# Patient Record
Sex: FEMALE | NOT HISPANIC OR LATINO | Employment: FULL TIME | URBAN - NONMETROPOLITAN AREA
[De-identification: names, ages, dates, MRNs, and addresses within clinical notes are randomized per-mention and may not be internally consistent; named-entity substitution may affect disease eponyms.]

---

## 2023-04-21 ENCOUNTER — OFFICE VISIT (OUTPATIENT)
Dept: PRIMARY CARE | Facility: CLINIC | Age: 23
End: 2023-04-21
Payer: MEDICAID

## 2023-04-21 VITALS
DIASTOLIC BLOOD PRESSURE: 81 MMHG | SYSTOLIC BLOOD PRESSURE: 127 MMHG | HEIGHT: 63 IN | HEART RATE: 91 BPM | BODY MASS INDEX: 22.68 KG/M2 | WEIGHT: 128 LBS

## 2023-04-21 DIAGNOSIS — M65.4 DE QUERVAIN'S DISEASE (TENOSYNOVITIS): ICD-10-CM

## 2023-04-21 DIAGNOSIS — L85.3 DRY SKIN DERMATITIS: ICD-10-CM

## 2023-04-21 DIAGNOSIS — Z00.00 ANNUAL PHYSICAL EXAM: Primary | ICD-10-CM

## 2023-04-21 PROCEDURE — 99214 OFFICE O/P EST MOD 30 MIN: CPT | Performed by: REGISTERED NURSE

## 2023-04-21 PROCEDURE — 1036F TOBACCO NON-USER: CPT | Performed by: REGISTERED NURSE

## 2023-04-21 RX ORDER — PREDNISONE 20 MG/1
40 TABLET ORAL DAILY
Qty: 10 TABLET | Refills: 0 | Status: SHIPPED | OUTPATIENT
Start: 2023-04-21 | End: 2023-04-26

## 2023-04-21 RX ORDER — IBUPROFEN 600 MG/1
TABLET ORAL
COMMUNITY
Start: 2022-12-29 | End: 2023-04-21 | Stop reason: WASHOUT

## 2023-04-21 RX ORDER — NORETHINDRONE ACETATE/ETHINYL ESTRADIOL AND FERROUS FUMARATE 1MG-20(21)
1 KIT ORAL DAILY
COMMUNITY
Start: 2023-02-01 | End: 2023-04-21 | Stop reason: WASHOUT

## 2023-04-21 RX ORDER — ONDANSETRON 4 MG/1
TABLET, FILM COATED ORAL
COMMUNITY
Start: 2022-06-12 | End: 2023-04-21 | Stop reason: WASHOUT

## 2023-04-21 RX ORDER — TRIAMCINOLONE ACETONIDE 1 MG/G
OINTMENT TOPICAL 2 TIMES DAILY PRN
Qty: 60 G | Refills: 0 | Status: SHIPPED | OUTPATIENT
Start: 2023-04-21 | End: 2023-08-19

## 2023-04-21 RX ORDER — ETODOLAC 300 MG/1
1 CAPSULE ORAL
COMMUNITY
Start: 2023-03-06 | End: 2023-04-21 | Stop reason: WASHOUT

## 2023-04-21 ASSESSMENT — ENCOUNTER SYMPTOMS
DIZZINESS: 0
WOUND: 0
RHINORRHEA: 0
SHORTNESS OF BREATH: 0
FEVER: 0
FREQUENCY: 0
HEADACHES: 0
CHILLS: 0
DIARRHEA: 0
WEAKNESS: 0
NAUSEA: 0
WHEEZING: 0
VOMITING: 0
DIFFICULTY URINATING: 0
NERVOUS/ANXIOUS: 0
ABDOMINAL PAIN: 0
COUGH: 0
EYE REDNESS: 0
CONSTIPATION: 0
EYE DISCHARGE: 0
CONFUSION: 0

## 2023-04-21 NOTE — PROGRESS NOTES
"Subjective   Patient ID: Jessica Pineda is a 23 y.o. female who presents for Establish Care.    HPI     Wrist pain: She was given an anti inflammatory and told to rest. DeQuervain Tenosynovitis. Denies any other injuries.   She had a daughter in December, she had an IV there. She was sore for a little bit and then it got worse. Denies numbness or tingling.    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    PMH: none   Surgery: ACL repaired, basketball.   Family: Mom Endometrial cancer 2 years ago   smoker: none   Etoh: sometimes   Drug use: none   PAP:  Following with  [facility]     Review of Systems   Constitutional:  Negative for chills and fever.   HENT:  Negative for congestion, ear pain and rhinorrhea.    Eyes:  Negative for discharge and redness.   Respiratory:  Negative for cough, shortness of breath and wheezing.    Cardiovascular:  Negative for chest pain and leg swelling.   Gastrointestinal:  Negative for abdominal pain, constipation, diarrhea, nausea and vomiting.   Genitourinary:  Negative for difficulty urinating, frequency and urgency.   Musculoskeletal:  Negative for gait problem.   Skin:  Negative for rash and wound.   Neurological:  Negative for dizziness, weakness and headaches.   Psychiatric/Behavioral:  Negative for confusion. The patient is not nervous/anxious.        Objective   /81 (BP Location: Left arm, Patient Position: Sitting, BP Cuff Size: Adult)   Pulse 91   Ht 1.6 m (5' 3\")   Wt 58.1 kg (128 lb)   BMI 22.67 kg/m²     Physical Exam  Vitals reviewed.   Constitutional:       Appearance: Normal appearance.   HENT:      Head: Normocephalic.      Right Ear: Tympanic membrane, ear canal and external ear normal.      Left Ear: Tympanic membrane, ear canal and external ear normal.      Nose: No rhinorrhea.      Mouth/Throat:      Mouth: Mucous membranes are moist.      Pharynx: Oropharynx is clear. "   Eyes:      Pupils: Pupils are equal, round, and reactive to light.   Cardiovascular:      Rate and Rhythm: Normal rate and regular rhythm.      Pulses: Normal pulses.   Pulmonary:      Effort: Pulmonary effort is normal.      Breath sounds: Normal breath sounds.   Abdominal:      General: Abdomen is flat. Bowel sounds are normal.      Palpations: Abdomen is soft.   Musculoskeletal:         General: No tenderness. Normal range of motion.      Right lower leg: No edema.      Left lower leg: No edema.      Comments: + Finkelstein test    Lymphadenopathy:      Cervical: No cervical adenopathy.   Skin:     General: Skin is warm and dry.      Findings: No rash.   Neurological:      Mental Status: She is alert and oriented to person, place, and time.   Psychiatric:         Mood and Affect: Mood normal.         Behavior: Behavior normal.         Assessment/Plan        #DeQuervain's   Continue wrist splint   Continue antiinflammatory  Trial Prednisone   If it is not improved in 3 more weeks   May need to see ortho for possible injections     #HCM  Pap utd with gyn   Blood work reviewed from last year no acute concerns

## 2023-12-07 PROBLEM — S83.249A ACUTE MEDIAL MENISCAL TEAR: Status: ACTIVE | Noted: 2023-12-07

## 2023-12-07 PROBLEM — M25.569 JOINT PAIN, KNEE: Status: ACTIVE | Noted: 2023-12-07

## 2023-12-07 PROBLEM — S83.519A ANTERIOR CRUCIATE LIGAMENT COMPLETE TEAR: Status: ACTIVE | Noted: 2023-12-07

## 2023-12-07 PROBLEM — F41.9 ANXIETY: Status: ACTIVE | Noted: 2023-12-07

## 2023-12-07 NOTE — PROGRESS NOTES
Subjective   Patient ID: Jessica Pineda is a 23 y.o. female who presents for Joint Pain (Recently started seeing chiropractor so issues has been resolved; would like to discuss anxiety today; rash on chest, itchy; ).    HPI   Rash on chest: Has been there for a couple weeks. No changes to anything topical. She did not try anything on it.     Anxiety: Week of thanksgiving, she was having heart fluttering. Happen for a couple days and having some Sob. She went to [facility], she had EKG and blood work and chest xray and ct and everything was normal. Was told it could be stress or anxiety. There is a lot of things going on. She is a new mom. Her fiance is getting deployed in 6 months. Everyday life.     Scheduled appointment for joint pains and tingling and has been seeing a chiropractor so these issues have resolved.     Wrist pain: She was given an anti inflammatory and told to rest. DeQuervain Tenosynovitis. Denies any other injuries.   She had a daughter in December, she had an IV there. She was sore for a little bit and then it got worse. Denies numbness or tingling.     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     PMH: none   Surgery: ACL repaired, basketball.   Family: Mom Endometrial cancer 2 years ago   smoker: none   Etoh: sometimes   Drug use: none   PAP:  Following with Dr. [facility]       Review of Systems   Constitutional:  Negative for chills and fever.   HENT:  Negative for congestion, ear pain and rhinorrhea.    Eyes:  Negative for discharge and redness.   Respiratory:  Negative for cough, shortness of breath and wheezing.    Cardiovascular:  Negative for chest pain and leg swelling.   Gastrointestinal:  Negative for abdominal pain, constipation, diarrhea, nausea and vomiting.   Genitourinary:  Negative for difficulty urinating, frequency and urgency.   Musculoskeletal:  Negative for gait problem.   Skin:  Negative for  "rash and wound.   Neurological:  Negative for dizziness, weakness and headaches.   Psychiatric/Behavioral:  Negative for confusion. The patient is not nervous/anxious.        Objective   /78 (BP Location: Right arm, Patient Position: Sitting, BP Cuff Size: Adult)   Pulse 95   Ht 1.6 m (5' 3\")   Wt 76.2 kg (168 lb)   BMI 29.76 kg/m²     Physical Exam  Chest:      Comments: Red rash down midline between breast        Assessment/Plan   Diagnoses and all orders for this visit:  Anxiety  -     hydrOXYzine HCL (Atarax) 25 mg tablet; Take 1 tablet (25 mg) by mouth 3 times a day.  - open to trailing daily medication if something as needed is not working well for her.   - Would try lexapro first   Rash and nonspecific skin eruption  Yeast infection  -     nystatin (Mycostatin) cream; Apply topically 2 times a day for 7 days. apply to affected area       #DeQuervain's   Continue wrist splint   Continue antiinflammatory     #HCM  Pap utd with gyn   Blood work reviewed from last year no acute concerns    "

## 2023-12-13 ENCOUNTER — OFFICE VISIT (OUTPATIENT)
Dept: PRIMARY CARE | Facility: CLINIC | Age: 23
End: 2023-12-13
Payer: MEDICAID

## 2023-12-13 VITALS
WEIGHT: 168 LBS | HEART RATE: 95 BPM | DIASTOLIC BLOOD PRESSURE: 78 MMHG | SYSTOLIC BLOOD PRESSURE: 125 MMHG | BODY MASS INDEX: 29.77 KG/M2 | HEIGHT: 63 IN

## 2023-12-13 DIAGNOSIS — B37.9 YEAST INFECTION: ICD-10-CM

## 2023-12-13 DIAGNOSIS — R21 RASH AND NONSPECIFIC SKIN ERUPTION: ICD-10-CM

## 2023-12-13 DIAGNOSIS — F41.9 ANXIETY: Primary | ICD-10-CM

## 2023-12-13 PROCEDURE — 99213 OFFICE O/P EST LOW 20 MIN: CPT | Performed by: REGISTERED NURSE

## 2023-12-13 RX ORDER — HYDROXYZINE HYDROCHLORIDE 25 MG/1
25 TABLET, FILM COATED ORAL 3 TIMES DAILY
Qty: 90 TABLET | Refills: 0 | Status: SHIPPED | OUTPATIENT
Start: 2023-12-13 | End: 2024-02-21 | Stop reason: ALTCHOICE

## 2023-12-13 RX ORDER — NYSTATIN 100000 U/G
CREAM TOPICAL 2 TIMES DAILY
Qty: 30 G | Refills: 0 | Status: SHIPPED | OUTPATIENT
Start: 2023-12-13 | End: 2023-12-20

## 2023-12-13 ASSESSMENT — ENCOUNTER SYMPTOMS
DIFFICULTY URINATING: 0
WEAKNESS: 0
HEADACHES: 0
ABDOMINAL PAIN: 0
CONSTIPATION: 0
FEVER: 0
WHEEZING: 0
EYE DISCHARGE: 0
VOMITING: 0
CONFUSION: 0
NERVOUS/ANXIOUS: 0
RHINORRHEA: 0
SHORTNESS OF BREATH: 0
DIARRHEA: 0
DIZZINESS: 0
EYE REDNESS: 0
CHILLS: 0
WOUND: 0
COUGH: 0
NAUSEA: 0
FREQUENCY: 0

## 2024-01-26 ENCOUNTER — OFFICE VISIT (OUTPATIENT)
Dept: PRIMARY CARE | Facility: CLINIC | Age: 24
End: 2024-01-26
Payer: MEDICAID

## 2024-01-26 VITALS
BODY MASS INDEX: 29.59 KG/M2 | HEART RATE: 93 BPM | DIASTOLIC BLOOD PRESSURE: 77 MMHG | WEIGHT: 167 LBS | SYSTOLIC BLOOD PRESSURE: 123 MMHG | HEIGHT: 63 IN

## 2024-01-26 DIAGNOSIS — J01.90 ACUTE SINUSITIS, RECURRENCE NOT SPECIFIED, UNSPECIFIED LOCATION: Primary | ICD-10-CM

## 2024-01-26 PROCEDURE — 99214 OFFICE O/P EST MOD 30 MIN: CPT | Performed by: FAMILY MEDICINE

## 2024-01-26 RX ORDER — CEPHALEXIN 500 MG/1
500 CAPSULE ORAL 2 TIMES DAILY
Qty: 20 CAPSULE | Refills: 0 | Status: SHIPPED | OUTPATIENT
Start: 2024-01-26 | End: 2024-02-06 | Stop reason: SDUPTHER

## 2024-01-26 RX ORDER — MECLIZINE HCL 12.5 MG 12.5 MG/1
TABLET ORAL
COMMUNITY
Start: 2024-01-25 | End: 2024-04-15 | Stop reason: WASHOUT

## 2024-01-26 NOTE — PROGRESS NOTES
"Jessica Pineda is a 23 y.o. female who presents for Sick Visit (Dizziness for the last week, did have a headache but that's gotten better, slight ear pain; fast movement and standing up makes dizziness worse)    Last Friday had a migraine and dizziness that lasted 4 days. HA resolved, still dizzy. Went to urgent care and was told it was benign vertigo and given rx for meclizine. She was given a shot of benadryl and that has helped a lot. She is having pressure in both ears. No ST, +PND, +Frontal pressure, +Rhinorrhea. No cough. No vomiting or diarrhea but she does feel nausea from the dizziness.     Objective   /77 (BP Location: Right arm, Patient Position: Sitting, BP Cuff Size: Adult)   Pulse 93   Ht 1.6 m (5' 3\")   Wt 75.8 kg (167 lb)   BMI 29.58 kg/m²     Gen: No acute distress, alert and oriented x3, pleasant   HEENT: moist mucous membranes, b/l external auditory canals are clear of debris, TMs within normal limits, no oropharyngeal lesions, eomi, perrla, B/L effusion noted  Neck: thyroid within normal limits, no lymphadenopathy   CV: RRR, normal S1/S2, no murmur   Resp: Clear to auscultation bilaterally, no wheezes or rhonchi appreciated  Abd: soft, nontender, non-distended, no guarding/rigidity, bowel sounds present  Extr: no edema, no calf tenderness  Derm: Skin is warm and dry, no rashes appreciated  Psych: mood is good, affect is congruent, good hygiene, normal speech and eye contact  Neuro: cranial nerves grossly intact, normal gait    Assessment/Plan     #Vertigo  Epley handout given  She has meclizine prn    #Acute sinusitis  Rx sent keflex  Recommend flonase as well  "

## 2024-02-06 DIAGNOSIS — J01.90 ACUTE SINUSITIS, RECURRENCE NOT SPECIFIED, UNSPECIFIED LOCATION: ICD-10-CM

## 2024-02-06 DIAGNOSIS — B37.9 YEAST INFECTION: Primary | ICD-10-CM

## 2024-02-06 RX ORDER — FLUCONAZOLE 150 MG/1
150 TABLET ORAL ONCE
Qty: 1 TABLET | Refills: 0 | Status: SHIPPED | OUTPATIENT
Start: 2024-02-06 | End: 2024-02-12 | Stop reason: SDUPTHER

## 2024-02-06 RX ORDER — CEPHALEXIN 500 MG/1
500 CAPSULE ORAL 2 TIMES DAILY
Qty: 14 CAPSULE | Refills: 0 | Status: SHIPPED | OUTPATIENT
Start: 2024-02-06 | End: 2024-02-12 | Stop reason: ALTCHOICE

## 2024-02-12 DIAGNOSIS — J01.90 ACUTE SINUSITIS, RECURRENCE NOT SPECIFIED, UNSPECIFIED LOCATION: Primary | ICD-10-CM

## 2024-02-12 DIAGNOSIS — B37.9 YEAST INFECTION: ICD-10-CM

## 2024-02-12 RX ORDER — PREDNISONE 20 MG/1
40 TABLET ORAL DAILY
Qty: 10 TABLET | Refills: 0 | Status: SHIPPED | OUTPATIENT
Start: 2024-02-12 | End: 2024-02-21 | Stop reason: ALTCHOICE

## 2024-02-12 RX ORDER — FLUCONAZOLE 150 MG/1
150 TABLET ORAL ONCE
Qty: 1 TABLET | Refills: 0 | Status: SHIPPED | OUTPATIENT
Start: 2024-02-12 | End: 2024-02-12

## 2024-02-12 RX ORDER — DOXYCYCLINE 100 MG/1
100 CAPSULE ORAL 2 TIMES DAILY
Qty: 20 CAPSULE | Refills: 0 | Status: SHIPPED | OUTPATIENT
Start: 2024-02-12 | End: 2024-02-21 | Stop reason: ALTCHOICE

## 2024-02-21 DIAGNOSIS — J01.90 ACUTE SINUSITIS, RECURRENCE NOT SPECIFIED, UNSPECIFIED LOCATION: Primary | ICD-10-CM

## 2024-02-21 RX ORDER — FLUTICASONE FUROATE 27.5 UG/1
1 SPRAY, METERED NASAL
Qty: 10 G | Refills: 5 | Status: SHIPPED | OUTPATIENT
Start: 2024-02-21 | End: 2025-02-20

## 2024-02-21 RX ORDER — CETIRIZINE HYDROCHLORIDE 10 MG/1
10 TABLET ORAL DAILY
Qty: 30 TABLET | Refills: 5 | Status: SHIPPED | OUTPATIENT
Start: 2024-02-21 | End: 2024-08-19

## 2024-02-21 RX ORDER — AMOXICILLIN AND CLAVULANATE POTASSIUM 400; 57 MG/5ML; MG/5ML
875 POWDER, FOR SUSPENSION ORAL EVERY 12 HOURS SCHEDULED
Qty: 218 ML | Refills: 0 | Status: SHIPPED | OUTPATIENT
Start: 2024-02-21 | End: 2024-03-02

## 2024-03-01 ENCOUNTER — HOSPITAL ENCOUNTER (EMERGENCY)
Facility: HOSPITAL | Age: 24
Discharge: HOME | End: 2024-03-01
Payer: MEDICAID

## 2024-03-01 ENCOUNTER — APPOINTMENT (OUTPATIENT)
Dept: RADIOLOGY | Facility: HOSPITAL | Age: 24
End: 2024-03-01
Payer: MEDICAID

## 2024-03-01 VITALS
DIASTOLIC BLOOD PRESSURE: 74 MMHG | WEIGHT: 170 LBS | OXYGEN SATURATION: 98 % | HEIGHT: 63 IN | BODY MASS INDEX: 30.12 KG/M2 | HEART RATE: 74 BPM | SYSTOLIC BLOOD PRESSURE: 116 MMHG | TEMPERATURE: 97.6 F | RESPIRATION RATE: 16 BRPM

## 2024-03-01 DIAGNOSIS — R51.9 ACUTE INTRACTABLE HEADACHE, UNSPECIFIED HEADACHE TYPE: Primary | ICD-10-CM

## 2024-03-01 LAB
FLUAV RNA RESP QL NAA+PROBE: NOT DETECTED
FLUBV RNA RESP QL NAA+PROBE: NOT DETECTED
RSV RNA RESP QL NAA+PROBE: NOT DETECTED
SARS-COV-2 RNA RESP QL NAA+PROBE: NOT DETECTED

## 2024-03-01 PROCEDURE — 70450 CT HEAD/BRAIN W/O DYE: CPT | Performed by: STUDENT IN AN ORGANIZED HEALTH CARE EDUCATION/TRAINING PROGRAM

## 2024-03-01 PROCEDURE — 70486 CT MAXILLOFACIAL W/O DYE: CPT

## 2024-03-01 PROCEDURE — 99285 EMERGENCY DEPT VISIT HI MDM: CPT | Mod: 25

## 2024-03-01 PROCEDURE — 96375 TX/PRO/DX INJ NEW DRUG ADDON: CPT

## 2024-03-01 PROCEDURE — 87637 SARSCOV2&INF A&B&RSV AMP PRB: CPT

## 2024-03-01 PROCEDURE — 96361 HYDRATE IV INFUSION ADD-ON: CPT

## 2024-03-01 PROCEDURE — 2500000004 HC RX 250 GENERAL PHARMACY W/ HCPCS (ALT 636 FOR OP/ED): Mod: SE

## 2024-03-01 PROCEDURE — 70450 CT HEAD/BRAIN W/O DYE: CPT

## 2024-03-01 PROCEDURE — 70486 CT MAXILLOFACIAL W/O DYE: CPT | Performed by: STUDENT IN AN ORGANIZED HEALTH CARE EDUCATION/TRAINING PROGRAM

## 2024-03-01 PROCEDURE — 96374 THER/PROPH/DIAG INJ IV PUSH: CPT

## 2024-03-01 RX ORDER — METOCLOPRAMIDE HYDROCHLORIDE 5 MG/ML
10 INJECTION INTRAMUSCULAR; INTRAVENOUS ONCE
Status: COMPLETED | OUTPATIENT
Start: 2024-03-01 | End: 2024-03-01

## 2024-03-01 RX ORDER — ACETAMINOPHEN 325 MG/1
TABLET ORAL
Status: DISCONTINUED
Start: 2024-03-01 | End: 2024-03-01 | Stop reason: HOSPADM

## 2024-03-01 RX ORDER — ACETAMINOPHEN 325 MG/1
650 TABLET ORAL ONCE
Status: COMPLETED | OUTPATIENT
Start: 2024-03-01 | End: 2024-03-01

## 2024-03-01 RX ORDER — DIPHENHYDRAMINE HYDROCHLORIDE 50 MG/ML
25 INJECTION INTRAMUSCULAR; INTRAVENOUS ONCE
Status: COMPLETED | OUTPATIENT
Start: 2024-03-01 | End: 2024-03-01

## 2024-03-01 RX ORDER — KETOROLAC TROMETHAMINE 30 MG/ML
30 INJECTION, SOLUTION INTRAMUSCULAR; INTRAVENOUS ONCE
Status: COMPLETED | OUTPATIENT
Start: 2024-03-01 | End: 2024-03-01

## 2024-03-01 RX ADMIN — METOCLOPRAMIDE HYDROCHLORIDE 10 MG: 5 INJECTION INTRAMUSCULAR; INTRAVENOUS at 18:29

## 2024-03-01 RX ADMIN — ACETAMINOPHEN 650 MG: 325 TABLET ORAL at 19:30

## 2024-03-01 RX ADMIN — KETOROLAC TROMETHAMINE 30 MG: 30 INJECTION, SOLUTION INTRAMUSCULAR; INTRAVENOUS at 18:27

## 2024-03-01 RX ADMIN — DIPHENHYDRAMINE HYDROCHLORIDE 25 MG: 50 INJECTION INTRAMUSCULAR; INTRAVENOUS at 18:28

## 2024-03-01 RX ADMIN — SODIUM CHLORIDE, POTASSIUM CHLORIDE, SODIUM LACTATE AND CALCIUM CHLORIDE 1000 ML: 600; 310; 30; 20 INJECTION, SOLUTION INTRAVENOUS at 18:26

## 2024-03-01 ASSESSMENT — PAIN DESCRIPTION - DESCRIPTORS: DESCRIPTORS: ACHING

## 2024-03-01 ASSESSMENT — COLUMBIA-SUICIDE SEVERITY RATING SCALE - C-SSRS
1. IN THE PAST MONTH, HAVE YOU WISHED YOU WERE DEAD OR WISHED YOU COULD GO TO SLEEP AND NOT WAKE UP?: NO
2. HAVE YOU ACTUALLY HAD ANY THOUGHTS OF KILLING YOURSELF?: NO
6. HAVE YOU EVER DONE ANYTHING, STARTED TO DO ANYTHING, OR PREPARED TO DO ANYTHING TO END YOUR LIFE?: NO

## 2024-03-01 ASSESSMENT — PAIN SCALES - GENERAL: PAINLEVEL_OUTOF10: 4

## 2024-03-01 ASSESSMENT — PAIN DESCRIPTION - PAIN TYPE: TYPE: ACUTE PAIN;CHRONIC PAIN

## 2024-03-01 ASSESSMENT — PAIN - FUNCTIONAL ASSESSMENT: PAIN_FUNCTIONAL_ASSESSMENT: 0-10

## 2024-03-01 ASSESSMENT — PAIN DESCRIPTION - LOCATION: LOCATION: HEAD

## 2024-03-01 ASSESSMENT — PAIN DESCRIPTION - FREQUENCY: FREQUENCY: CONSTANT/CONTINUOUS

## 2024-03-01 NOTE — ED PROVIDER NOTES
HPI   Chief Complaint   Patient presents with    Headache     Patient states head pressure for one month, she has been diagnosed with a sinus infection last month but her symptoms are still present        Patient is a 23-year-old female presenting to the ED with cc of head pressure x 1 month.  Patient states she has been diagnosed with bilateral ear infection, sinusitis and has had 3 rounds of antibiotics with no relief in symptoms.  Patient states she was in the ER the 24th for similar symptoms and again diagnosed with an ear infection and discharged home with antibiotics.  Patient denies any head injury.  Patient denies any history of migraines.  Patient states it is a bandlike pressure around the head and throbbing.  Patient states it is constant.  Patient has not had any pain medication today.  Patient states she did take an antihistamine with no relief in symptoms.  Patient states that the soonest she can go to an ENT is the 12th.  Patient denies any vision changes, fever chills, congestion rhinorrhea cough chest pain shortness of breath nausea vomiting abdominal pain diarrhea constipation.  Patient's last normal menstrual cycle was the beginning of this month.  Patient denies any alcohol or street drug abuse.  Patient states she does vape.                          [place] Coma Scale Score: 15                     Patient History   Past Medical History:   Diagnosis Date    Pilonidal cyst with abscess 09/25/2014    Pilonidal cyst with abscess     Past Surgical History:   Procedure Laterality Date    ANTERIOR CRUCIATE LIGAMENT REPAIR  08/26/2014    Primary Repair Of Knee Ligament Cruciate Anterior     No family history on file.  Social History     Tobacco Use    Smoking status: Former     Types: Cigarettes     Quit date: 2021     Years since quitting: 3.1    Smokeless tobacco: Current     Types: Snuff   Substance Use Topics    Alcohol use: Not Currently    Drug use: Never       Physical Exam   ED Triage Vitals  [03/01/24 1543]   Temperature Heart Rate Respirations BP   36.4 °C (97.6 °F) 100 19 138/84      SpO2 Temp src Heart Rate Source Patient Position   -- -- -- --      BP Location FiO2 (%)     -- --       Physical Exam  Constitutional:       Appearance: She is well-developed.   HENT:      Head: Normocephalic.   Eyes:      Extraocular Movements: Extraocular movements intact.      Pupils: Pupils are equal, round, and reactive to light.   Cardiovascular:      Rate and Rhythm: Normal rate and regular rhythm.      Heart sounds: Normal heart sounds.   Pulmonary:      Effort: Pulmonary effort is normal.      Breath sounds: Normal breath sounds. No wheezing, rhonchi or rales.   Abdominal:      Palpations: Abdomen is soft.      Tenderness: There is no abdominal tenderness. There is no guarding.   Musculoskeletal:         General: No tenderness. Normal range of motion.      Cervical back: Normal range of motion and neck supple.   Skin:     General: Skin is warm.      Capillary Refill: Capillary refill takes less than 2 seconds.   Neurological:      Mental Status: She is alert and oriented to person, place, and time. Mental status is at baseline.      GCS: GCS eye subscore is 4. GCS verbal subscore is 5. GCS motor subscore is 6.      Cranial Nerves: No cranial nerve deficit or facial asymmetry.      Sensory: No sensory deficit.      Motor: No weakness.   Psychiatric:         Mood and Affect: Mood normal.         Behavior: Behavior normal.         ED Course & MDM   Diagnoses as of 03/01/24 2005   Acute intractable headache, unspecified headache type       Medical Decision Making  Medical Decision Making:  Patient presented as described in HPI. Patient case including ROS, PE, and treatment and plan discussed with ED attending if attached as cosigner. Due to patients presentation orders completed include as documented.  Patient presents to the ED with cc of headache x 1 month that is not improving.  Patient is a diagnosis of  bilateral ear infection and sinusitis and has been on multiple different products with no relief.  Patient denies any fever chills or URI symptoms.  Patient has not had any pain medication for symptoms here.  Patient denies any history of migraines.  Patient is nontoxic-appearing, abdomen is soft and nontender lung sounds are clear bilaterally, no pain to percussion of the frontal or maxillary sinuses, TMs nonbulging opaque bilaterally, full range of motion of the neck, no neurofocal deficits.  Patient given fluids Toradol Reglan Benadryl for symptoms.  Pending labs.  COVID flu RSV negative.  CT head and sinus negative.  Patient educated this findings.  Patient discharged home with referrals to ENT and neurology.  Patient educated ibuprofen Tylenol for home.  Patient educated on any worsening symptoms to return.  Patient remained stable and discharged  Patient was advised to follow up with PCP or recommended provider in 2-3 days for another evaluation and exam. I advised patient/guardian to return or go to closest emergency room immediately if symptoms change, get worse, new symptoms develop prior to follow up. If there is no improvement in symptoms in the next 24 hours they are advised to return for further evaluation and exam. I also explained the plan and treatment course. Patient/guardian is in agreement with plan, treatment course, and follow up and states verbally that they will comply.        Patient care discussed with: N/A  Social Determinants affecting care: N/A    Final diagnosis and disposition as below.  See CI    Homegoing. I discussed the differential; results and discharge plan with the patient and/or family/friend/caregiver if present.  I emphasized the importance of follow-up with the physician I referred them to in the timeframe recommended.  I explained reasons for the patient to return to the Emergency Department. They agreed that if they feel their condition is worsening or if they have any  other concern they should call 911 immediately for further assistance. I gave the patient an opportunity to ask all questions they had and answered all of them accordingly. They understand return precautions and discharge instructions. The patient and/or family/friend/caregiver expressed understanding verbally and that they would comply.       Disposition:  Discharge      This note has been transcribed using voice recognition and may contain grammatical errors, misplaced words, incorrect words, incorrect phrases or other errors.        Labs Reviewed   SARS-COV-2 AND INFLUENZA A/B PCR - Normal       Result Value    Flu A Result Not Detected      Flu B Result Not Detected      Coronavirus 2019, PCR Not Detected      Narrative:     This assay has received FDA Emergency Use Authorization (EUA) and  is only authorized for the duration of time that circumstances exist to justify the authorization of the emergency use of in vitro diagnostic tests for the detection of SARS-CoV-2 virus and/or diagnosis of COVID-19 infection under section 564(b)(1) of the Act, 21 U.S.C. 360bbb-3(b)(1). Testing for SARS-CoV-2 is only recommended for patients who meet current clinical and/or epidemiological criteria as defined by federal, state, or local public health directives. This assay is an in vitro diagnostic nucleic acid amplification test for the qualitative detection of SARS-CoV-2, Influenza A, and Influenza B from nasopharyngeal specimens and has been validated for use at [facility]. Negative results do not preclude COVID-19 infections or Influenza A/B infections, and should not be used as the sole basis for diagnosis, treatment, or other management decisions. If Influenza A/B and RSV PCR results are negative, testing for Parainfluenza virus, Adenovirus and Metapneumovirus is routinely performed for [facility] [place] in[place], and is available on other patients by placing an add-on  request.    RSV PCR - Normal    RSV PCR Not Detected      Narrative:     This assay is an FDA-cleared, in vitro diagnostic nucleic acid amplification test for the detection of RSV from nasopharyngeal specimens, and has been validated for use at [facility]. Negative results do not preclude RSV infections, and should not be used as the sole basis for diagnosis, treatment, or other management decisions. If Influenza A/B and RSV PCR results are negative, testing for Parainfluenza virus, Adenovirus and Metapneumovirus is routinely performed for pediatric oncology and intensive care inpatients at [facility], and is available on other patients by placing an add-on request.       URINALYSIS WITH REFLEX CULTURE AND MICROSCOPIC    Narrative:     The following orders were created for panel order Urinalysis with Reflex Culture and Microscopic.  Procedure                               Abnormality         Status                     ---------                               -----------         ------                     Urinalysis with Reflex C...[[id number]]                                                 Extra Urine Gray Tube[[id number]]                                                         Please view results for these tests on the individual orders.   HCG, URINE, QUALITATIVE   URINALYSIS WITH REFLEX CULTURE AND MICROSCOPIC   EXTRA URINE GRAY TUBE      CT head wo IV contrast   Final Result   No acute intracranial intracranial hemorrhage or acute cortical   infarct. No evidence of facial bone fracture or paranasal sinus   opacification. Nasal septum deviation, convexity to the left with   mild impingement on the left inferior turbinate.        Signed by: Victor Manuel Zuluaga 3/1/2024 7:56 PM   Dictation workstation:   [id number]      CT maxillofacial bones wo IV contrast   Final Result   No acute intracranial intracranial hemorrhage or acute cortical   infarct. No evidence of facial bone fracture or paranasal sinus    opacification. Nasal septum deviation, convexity to the left with   mild impingement on the left inferior turbinate.        Signed by: Victor Manuel Zuluaga 3/1/2024 7:56 PM   Dictation workstation:   [id number]         Procedure  Procedures     Humera Treviño PA-C  03/01/24 2006

## 2024-03-05 ENCOUNTER — HOSPITAL ENCOUNTER (EMERGENCY)
Facility: HOSPITAL | Age: 24
Discharge: HOME | End: 2024-03-05
Attending: EMERGENCY MEDICINE
Payer: MEDICAID

## 2024-03-05 VITALS
OXYGEN SATURATION: 100 % | RESPIRATION RATE: 14 BRPM | BODY MASS INDEX: 30.12 KG/M2 | WEIGHT: 170 LBS | HEART RATE: 80 BPM | DIASTOLIC BLOOD PRESSURE: 77 MMHG | HEIGHT: 63 IN | SYSTOLIC BLOOD PRESSURE: 125 MMHG | TEMPERATURE: 97.4 F

## 2024-03-05 DIAGNOSIS — G43.811 OTHER MIGRAINE WITH STATUS MIGRAINOSUS, INTRACTABLE: Primary | ICD-10-CM

## 2024-03-05 PROCEDURE — 99283 EMERGENCY DEPT VISIT LOW MDM: CPT

## 2024-03-05 PROCEDURE — 2500000005 HC RX 250 GENERAL PHARMACY W/O HCPCS: Mod: SE | Performed by: EMERGENCY MEDICINE

## 2024-03-05 PROCEDURE — 2500000001 HC RX 250 WO HCPCS SELF ADMINISTERED DRUGS (ALT 637 FOR MEDICARE OP): Mod: SE | Performed by: EMERGENCY MEDICINE

## 2024-03-05 RX ORDER — ONDANSETRON 4 MG/1
4 TABLET, ORALLY DISINTEGRATING ORAL EVERY 8 HOURS PRN
Qty: 15 TABLET | Refills: 0 | Status: SHIPPED | OUTPATIENT
Start: 2024-03-05 | End: 2024-04-15 | Stop reason: WASHOUT

## 2024-03-05 RX ORDER — ONDANSETRON 4 MG/1
4 TABLET, ORALLY DISINTEGRATING ORAL ONCE
Status: COMPLETED | OUTPATIENT
Start: 2024-03-05 | End: 2024-03-05

## 2024-03-05 RX ORDER — BUTALBITAL, ACETAMINOPHEN AND CAFFEINE 50; 325; 40 MG/1; MG/1; MG/1
1 TABLET ORAL EVERY 6 HOURS PRN
Qty: 12 TABLET | Refills: 0 | Status: SHIPPED | OUTPATIENT
Start: 2024-03-05 | End: 2024-03-08

## 2024-03-05 RX ORDER — ACETAMINOPHEN AND CODEINE PHOSPHATE 300; 30 MG/1; MG/1
1 TABLET ORAL ONCE
Status: COMPLETED | OUTPATIENT
Start: 2024-03-05 | End: 2024-03-05

## 2024-03-05 RX ORDER — METOCLOPRAMIDE 5 MG/1
10 TABLET ORAL ONCE
Status: COMPLETED | OUTPATIENT
Start: 2024-03-05 | End: 2024-03-05

## 2024-03-05 RX ADMIN — ACETAMINOPHEN AND CODEINE PHOSPHATE 1 TABLET: 300; 30 TABLET ORAL at 12:59

## 2024-03-05 RX ADMIN — ONDANSETRON 4 MG: 4 TABLET, ORALLY DISINTEGRATING ORAL at 12:59

## 2024-03-05 RX ADMIN — METOCLOPRAMIDE 10 MG: 5 TABLET ORAL at 12:59

## 2024-03-05 ASSESSMENT — PAIN DESCRIPTION - FREQUENCY: FREQUENCY: CONSTANT/CONTINUOUS

## 2024-03-05 ASSESSMENT — COLUMBIA-SUICIDE SEVERITY RATING SCALE - C-SSRS
6. HAVE YOU EVER DONE ANYTHING, STARTED TO DO ANYTHING, OR PREPARED TO DO ANYTHING TO END YOUR LIFE?: NO
2. HAVE YOU ACTUALLY HAD ANY THOUGHTS OF KILLING YOURSELF?: NO
1. IN THE PAST MONTH, HAVE YOU WISHED YOU WERE DEAD OR WISHED YOU COULD GO TO SLEEP AND NOT WAKE UP?: NO

## 2024-03-05 ASSESSMENT — PAIN DESCRIPTION - PAIN TYPE: TYPE: ACUTE PAIN

## 2024-03-05 ASSESSMENT — PAIN DESCRIPTION - LOCATION: LOCATION: HEAD

## 2024-03-05 ASSESSMENT — PAIN SCALES - GENERAL: PAINLEVEL_OUTOF10: 8

## 2024-03-05 ASSESSMENT — PAIN - FUNCTIONAL ASSESSMENT: PAIN_FUNCTIONAL_ASSESSMENT: 0-10

## 2024-03-05 NOTE — ED PROVIDER NOTES
[facility]   ED  Provider Note  3/5/2024 12:07 PM  AC04/AC04        History of Present Illness:   Jessica Pineda is a 23 y.o. female presenting to the ED for headache pain, beginning 1 month ago.  The complaint has been persistent, moderate to severe in severity, and worsened by loud noises and bright lights.  Patient was worked up extensive laboratory testing CT head and CT facial bones.  There is no evidence of acute infection in any of her previous test.  She denies purulent nasal discharge or sore throat.  She has a throbbing headache throughout her head.  She denies temporal artery tenderness.  She denies stiff neck.  She has had no fever or chills.  No family history of migraine headaches.  She denies any carbon monoxide exposure change in medication or change in diet.      Review of Systems:   Pertinent positives and review of systems as noted above.  Remaining 10 review of systems is negative or noncontributory to today's episode of care.  Review of Systems       --------------------------------------------- PAST HISTORY ---------------------------------------------  Past Medical History:  has a past medical history of Pilonidal cyst with abscess (09/25/2014).    Past Surgical History:  has a past surgical history that includes Anterior cruciate ligament repair (08/26/2014).    Social History:  reports that she quit smoking about 3 years ago. Her smoking use included cigarettes. Her smokeless tobacco use includes snuff. She reports that she does not currently use alcohol. She reports that she does not use drugs.    Family History: family history is not on file. Unless otherwise noted, family history is non contributory    Patient's Medications   New Prescriptions    No medications on file   Previous Medications    CETIRIZINE (ZYRTEC) 10 MG TABLET    Take 1 tablet (10 mg) by mouth once daily.    FLUTICASONE (FLONASE SENSIMIST) 27.5 MCG/ACTUATION NASAL SPRAY    Administer 1 spray into each nostril once daily.  "   MECLIZINE (ANTIVERT) 12.5 MG TABLET       Modified Medications    No medications on file   Discontinued Medications    No medications on file      The patient’s home medications have been reviewed.    Allergies: Patient has no known allergies.    -------------------------------------------------- RESULTS -------------------------------------------------  All laboratory and radiology results have been personally reviewed by myself   LABS:  Labs Reviewed - No data to display      RADIOLOGY:  Interpreted by Radiologist.  No orders to display       No results found for this or any previous visit (from the past 4464 hour(s)).  ------------------------- NURSING NOTES AND VITALS REVIEWED ---------------------------   The nursing notes within the ED encounter and vital signs as below have been reviewed.   /87   Pulse 85   Temp 36.3 °C (97.4 °F)   Resp 16   Ht 1.6 m (5' 3\")   Wt 77.1 kg (170 lb)   LMP 02/07/2024   SpO2 98%   BMI 30.11 kg/m²   Oxygen Saturation Interpretation: Normal      ---------------------------------------------------PHYSICAL EXAM--------------------------------------  Physical Exam   Constitutional/General: Alert and oriented x3, well appearing, non toxic in NAD  Head: Normocephalic and atraumatic, no temporal artery tenderness, no rash on the scalp,  Eyes: PERRL, EOMI, conjunctiva normal, sclera non icteric, discs are sharp  Ears: Tympanic membranes are normal  Mouth: Oropharynx clear, handling secretions, no trismus, no asymmetry of the posterior oropharynx or uvular edema  Neck: Supple, full ROM, non tender to palpation in the midline, no stridor, no crepitus, no meningeal signs  Respiratory: Lungs clear to auscultation bilaterally, no wheezes, rales, or rhonchi. Not in respiratory distress  Cardiovascular:  Regular rate. Regular rhythm. No murmurs, gallops, or rubs. 2+ distal pulses  Chest: No chest wall tenderness  GI:  Abdomen Soft, Non tender, Non distended.  +BS. No " organomegaly, no palpable masses,  No rebound, guarding, or rigidity.   Musculoskeletal: Moves all extremities x 4. Warm and well perfused, no clubbing, cyanosis, or edema. Capillary refill <3 seconds  Integument: skin warm and dry. No rashes.   Lymphatic: no lymphadenopathy noted  Neurologic: No focal deficits, symmetric strength 5/5 in the upper and lower extremities bilaterally  Psychiatric: Normal Affect    Procedures    ------------------------------ ED COURSE/MEDICAL DECISION MAKING----------------------  Diagnoses as of 03/05/24 1328   Other migraine with status migrainosus, intractable      Patient is a throbbing headaches been present off-and-on for the past month.  She has had several visits to her doctor's office and ERs for this problem she has had multiple CT scans and laboratory studies done all of which have been negative.  She has tried 2 courses of antibiotics Flonase and cetirizine without relief.  Her symptoms are most consistent with a migraine headache with throbbing headache photophobia and phonophobia.  I will treat her as such with Fioricet and Zofran ODT.  I have asked her to follow-up with Dr. Hairston for further evaluation of her migraines.      Medical Decision Making:   Discharged to home  Diagnoses as of 03/05/24 1328   Other migraine with status migrainosus, intractable      Counseling:   The emergency provider has spoken with the patient and discussed today’s results, in addition to providing specific details for the plan of care and counseling regarding the diagnosis and prognosis.  Questions are answered at this time and they are agreeable with the plan.      --------------------------------- IMPRESSION AND DISPOSITION ---------------------------------        IMPRESSION  1. Other migraine with status migrainosus, intractable        DISPOSITION  Disposition: Discharge to home  Patient condition is good      Billing Provider Critical Care Time: 0 minutes     Medhat Keen  MD  03/05/24 5032

## 2024-03-05 NOTE — DISCHARGE INSTRUCTIONS
Midrin as prescribed for headaches.    Take Zofran ODT as needed for nausea.    Follow-up with Dr. Fernando for further care and evaluation of your headaches.    Return for worsening symptoms or concerns.

## 2024-03-06 DIAGNOSIS — G43.809 OTHER MIGRAINE WITHOUT STATUS MIGRAINOSUS, NOT INTRACTABLE: ICD-10-CM

## 2024-03-06 DIAGNOSIS — F41.9 ANXIETY: Primary | ICD-10-CM

## 2024-03-06 RX ORDER — SUMATRIPTAN SUCCINATE 25 MG/1
25 TABLET ORAL ONCE AS NEEDED
Qty: 9 TABLET | Refills: 0 | Status: SHIPPED | OUTPATIENT
Start: 2024-03-06 | End: 2024-04-15 | Stop reason: WASHOUT

## 2024-03-06 RX ORDER — ESCITALOPRAM OXALATE 10 MG/1
10 TABLET ORAL DAILY
Qty: 30 TABLET | Refills: 5 | Status: SHIPPED | OUTPATIENT
Start: 2024-03-06 | End: 2024-09-02

## 2024-03-12 ENCOUNTER — APPOINTMENT (OUTPATIENT)
Dept: OTOLARYNGOLOGY | Facility: CLINIC | Age: 24
End: 2024-03-12
Payer: MEDICAID

## 2024-04-10 NOTE — PROGRESS NOTES
"Jessica Pineda is a 24 y.o. female who presents for Follow-up (6 weeks, dizziness is a little better, dx with TMJ disorder and believes that's cause of HA and vertigo; having vaginal itchy and back/kidney pain, recently had pap and was dx with BV, unsure if yeast inf or UTI)    Started with migraines for the first time at the end of December. Diagnosed with TMJ by her dentist (Dmitry at [facility]) and then saw ENT who suspects she has eustachian tube dysfunction. Likely cause of vertigo and pain. She is getting a 24 hour dental appliance from the dentist to help with TMJ. She is not on antibiotics, she is taking prednisone from ENT as well. Stress level is pretty good. Still getting dizziness with certain activities. Ear pressure isn't going away. Frontal sinus HA has not resolved. She is taking magnesium to help with muscle tightness, no improvement, she has had it for a couple weeks. She is trying to do self massage but that also hasn't worked.     Recent pap (Dr. Vogel) a couple weeks ago, diagnosed with BV, started on abx. She is having itching and \"kidney pain.\" (Left sided flank pain). No fevers or chills. She is having some itching. No dysuria but she is having a warm sensation with urination. Some vaginal discharge.  No frequency but she does not feel that she is emptying fully.     Her mom has ovarian cancer, she just had 2nd surgery for reoccurence and is getting scans tomorrow. She has a 16mo old named Leslie. Her partner (last name Chalo) is getting deployed to [facility] in June, waiting on having any other children until he returns at this point.     Objective   /87 (BP Location: Left arm, Patient Position: Sitting, BP Cuff Size: Adult)   Pulse 83   Ht 1.6 m (5' 3\")   Wt 73.9 kg (163 lb)   BMI 28.87 kg/m²     Gen: No acute distress, alert and oriented x3, pleasant   HEENT: moist mucous membranes, b/l external auditory canals are clear of debris, TMs within normal limits, no oropharyngeal " lesions, eomi, perrla   Neck: thyroid within normal limits, no lymphadenopathy   CV: RRR, normal S1/S2, no murmur   Resp: Clear to auscultation bilaterally, no wheezes or rhonchi appreciated  Abd: soft, nontender, non-distended, no guarding/rigidity, bowel sounds present  Extr: no edema, no calf tenderness  Derm: Skin is warm and dry, no rashes appreciated  Psych: mood is good, affect is congruent, good hygiene, normal speech and eye contact  Neuro: cranial nerves grossly intact, normal gait    Assessment/Plan     #TMJ  Getting HA, eustachian tube dysfunction, and vertigo  On prednisone currently  Multiple rounds of antibiotics  Now planning for tmj dental appliance  Rx sent muscle relaxer to try    #Anxiety  On lexapro  Feels it is helping   Hold at current dose     #BV  Urinary symptoms  Cxr pending  On flagyl  Rx sent bactrim, may hold off until urine symptoms return  Rx sent fluconazole as well

## 2024-04-15 ENCOUNTER — OFFICE VISIT (OUTPATIENT)
Dept: OTOLARYNGOLOGY | Facility: CLINIC | Age: 24
End: 2024-04-15
Payer: MEDICAID

## 2024-04-15 DIAGNOSIS — M26.609 TEMPOROMANDIBULAR DISORDER: Primary | ICD-10-CM

## 2024-04-15 PROCEDURE — 99204 OFFICE O/P NEW MOD 45 MIN: CPT | Performed by: OTOLARYNGOLOGY

## 2024-04-15 RX ORDER — PREDNISONE 10 MG/1
TABLET ORAL
Qty: 34 TABLET | Refills: 0 | Status: SHIPPED | OUTPATIENT
Start: 2024-04-15 | End: 2024-04-17 | Stop reason: ALTCHOICE

## 2024-04-15 RX ORDER — IBUPROFEN 600 MG/1
600 TABLET ORAL 2 TIMES DAILY
Qty: 20 TABLET | Refills: 0 | Status: SHIPPED | OUTPATIENT
Start: 2024-04-15 | End: 2024-04-25

## 2024-04-15 ASSESSMENT — ENCOUNTER SYMPTOMS
SORE THROAT: 0
RHINORRHEA: 0
DIARRHEA: 0
VOMITING: 0
HEADACHES: 1
NECK PAIN: 1
COUGH: 0
ABDOMINAL PAIN: 0

## 2024-04-15 NOTE — LETTER
"April 15, 2024     Eunice Briceño DO  [address]   [place] [place] [facility] [place] [address]    Patient: Jessica Pineda   YOB: 2000   Date of Visit: 4/15/2024       Dear Dr. Eunice Briceño DO:    Thank you for referring Jessica Pineda to me for evaluation. Below are my notes for this consultation.  If you have questions, please do not hesitate to call me. I look forward to following your patient along with you.       Sincerely,     Daly Hankins MD      CC: No Recipients  ______________________________________________________________________________________    History Of Present Illness  Jessica Pineda is a 24 y.o. female presenting with: \"Ear pain/pressure\".  She is kindly referred by Dr. Eunice Briceño DO.     She has bilateral ear pain and pressure, for the past couple of months.   Facial pressure (+). Her dentist thinks she has TMJ. She grinds her teeth at night.    No change in hearing.  Dizziness (+), off and on. Can happen every day. It may last seconds. It may happen when she is moving around.   Tinnitus (+) it has been going on for a while, not new.    She has tried oral antibiotics and zyrtec, which did not help.  She had a sinus CT at [facility], no sinus infection. Septum deviated to left.    On examination, TMs, ears look normal.   Mild tenderness at retromandibular area, tenderness at masseter muscles.    Plan:  1- ibuprofen 600 mg tab  2- heat application, massage, soft food  3- prednisone tab  4- follow up in 2 weeks if the problem persists     Past Medical History  She has a past medical history of Pilonidal cyst with abscess (09/25/2014).    Surgical History  She has a past surgical history that includes Anterior cruciate ligament repair (08/26/2014).     Social History  She reports that she quit smoking about 3 years ago. Her smoking use included cigarettes. Her smokeless tobacco use includes snuff. She reports that she does not currently use alcohol. She reports that she does not use " "drugs.    Family History  No family history on file.     Allergies  Patient has no known allergies.    Review of Systems   Ear pain  Vertigo  Tinnitus  Ears feel full  Sinus pressure  Headache  Migraines  Answers submitted by the patient for this visit:  Ear Pain Questionnaire (Submitted on 4/15/2024)  Chief Complaint: Ear pain  Affected ear: both  Chronicity: recurrent  Onset: more than 1 month ago  Progression since onset: waxing and waning  Frequency: every few hours  Fever: no fever  Pain - numeric: 5/10  abdominal pain: No  ear discharge: No  rash: No  cough: No  headaches: Yes  rhinorrhea: No  diarrhea: No  hearing loss: No  sore throat: No  neck pain: Yes  vomiting: No     Physical Exam    General appearance: Healthy-appearing, well-nourished, well groomed, in no acute distress.     Head and Face: Atraumatic with no masses, lesions, or scarring.      Salivary glands: No tenderness of the parotid glands or parotid masses.     No tenderness of the submandibular glands or submandibular masses.      Facial strength: Normal strength and symmetry, no synkinesis or facial tic.     Eyes: Conjunctivas look non-hyperemic bilaterally    Ears: Bilaterally ear canals look normal. Tympanic membranes look intact, no hyperemia, fluid or retraction. Hearing grossly normal.      Nose: septum deviated to left (from CT)    Throat: Not checked today.    Neck: Symmetrical, trachea midline.     Pulmonary: Normal respiratory effort.     Lymphatic: No palpable pathologic lymph nodes at neck.     Neurological/Psychiatric Orientation to person, place, and time: Normal.     Mood and affect: Normal.      Extremities: No clubbing.     Skin: No significant skin lesions were noted at face or neck        Procedure         Last Recorded Vitals  There were no vitals taken for this visit.    Relevant Results    Assessment and Plan:  Jessica Pineda is a 24 y.o. female presenting with: \"Ear pain/pressure\".  She is kindly referred by Dr. Dawson " ALEXANDRA Briceño DO.     She has bilateral ear pain and pressure, for the past couple of months.   Facial pressure (+). Her dentist thinks she has TMJ. She grinds her teeth at night.    No change in hearing.  Dizziness (+), off and on. Can happen every day. It may last seconds. It may happen when she is moving around.   Tinnitus (+) it has been going on for a while, not new.    She has tried oral antibiotics and zyrtec, which did not help.  She had a sinus CT at [facility], no sinus infection. Septum deviated to left.    On examination, TMs, ears look normal.   Mild tenderness at retromandibular area, tenderness at masseter muscles.    Plan:  1- ibuprofen 600 mg tab  2- heat application, massage, soft food  3- prednisone tab  4- follow up in 2 weeks if the problem persists        Daly Hankins  Otolaryngology - Head & Neck Surgery

## 2024-04-17 ENCOUNTER — OFFICE VISIT (OUTPATIENT)
Dept: PRIMARY CARE | Facility: CLINIC | Age: 24
End: 2024-04-17
Payer: MEDICAID

## 2024-04-17 VITALS
HEIGHT: 63 IN | WEIGHT: 163 LBS | SYSTOLIC BLOOD PRESSURE: 126 MMHG | DIASTOLIC BLOOD PRESSURE: 87 MMHG | HEART RATE: 83 BPM | BODY MASS INDEX: 28.88 KG/M2

## 2024-04-17 DIAGNOSIS — R39.9 URINARY SYMPTOM OR SIGN: ICD-10-CM

## 2024-04-17 DIAGNOSIS — N39.0 URINARY TRACT INFECTION WITHOUT HEMATURIA, SITE UNSPECIFIED: ICD-10-CM

## 2024-04-17 DIAGNOSIS — R68.84 JAW PAIN: Primary | ICD-10-CM

## 2024-04-17 LAB
POC APPEARANCE, URINE: ABNORMAL
POC BILIRUBIN, URINE: NEGATIVE
POC BLOOD, URINE: NEGATIVE
POC COLOR, URINE: ABNORMAL
POC GLUCOSE, URINE: NEGATIVE MG/DL
POC KETONES, URINE: NEGATIVE MG/DL
POC LEUKOCYTES, URINE: ABNORMAL
POC NITRITE,URINE: NEGATIVE
POC PH, URINE: 7 PH
POC PROTEIN, URINE: NEGATIVE MG/DL
POC SPECIFIC GRAVITY, URINE: 1.01
POC UROBILINOGEN, URINE: 0.2 EU/DL

## 2024-04-17 PROCEDURE — 99214 OFFICE O/P EST MOD 30 MIN: CPT | Performed by: FAMILY MEDICINE

## 2024-04-17 PROCEDURE — 87086 URINE CULTURE/COLONY COUNT: CPT

## 2024-04-17 PROCEDURE — 81003 URINALYSIS AUTO W/O SCOPE: CPT | Performed by: FAMILY MEDICINE

## 2024-04-17 RX ORDER — MAGNESIUM 200 MG
TABLET ORAL
COMMUNITY

## 2024-04-17 RX ORDER — CYCLOBENZAPRINE HCL 10 MG
10 TABLET ORAL 3 TIMES DAILY PRN
Qty: 21 TABLET | Refills: 0 | Status: SHIPPED | OUTPATIENT
Start: 2024-04-17 | End: 2024-04-24

## 2024-04-17 RX ORDER — SULFAMETHOXAZOLE AND TRIMETHOPRIM 800; 160 MG/1; MG/1
1 TABLET ORAL 2 TIMES DAILY
Qty: 10 TABLET | Refills: 0 | Status: SHIPPED | OUTPATIENT
Start: 2024-04-17 | End: 2024-04-22

## 2024-04-17 RX ORDER — FLUCONAZOLE 150 MG/1
150 TABLET ORAL ONCE
Qty: 1 TABLET | Refills: 0 | Status: SHIPPED | OUTPATIENT
Start: 2024-04-17 | End: 2024-04-17

## 2024-04-19 LAB — BACTERIA UR CULT: NORMAL

## 2024-04-22 ENCOUNTER — APPOINTMENT (OUTPATIENT)
Dept: PRIMARY CARE | Facility: CLINIC | Age: 24
End: 2024-04-22
Payer: MEDICAID

## 2024-04-24 ENCOUNTER — APPOINTMENT (OUTPATIENT)
Dept: PRIMARY CARE | Facility: CLINIC | Age: 24
End: 2024-04-24
Payer: MEDICAID

## 2024-06-19 ENCOUNTER — APPOINTMENT (OUTPATIENT)
Dept: NEUROLOGY | Facility: CLINIC | Age: 24
End: 2024-06-19
Payer: MEDICAID

## 2024-07-31 NOTE — PROGRESS NOTES
"Subjective   Patient ID: Jessica Pineda is a 24 y.o. female who presents for Follow-up (3 months; headaches are a LOT better).    Started with migraines for the first time at the end of December. Diagnosed with TMJ by her dentist (Dmitry at [facility]) and then saw ENT who suspects she has eustachian tube dysfunction. Likely cause of vertigo and pain. She is getting a 24 hour dental appliance from the dentist to help with TMJ. She is not on antibiotics, she is taking prednisone from ENT as well. Stress level is pretty good. Still getting dizziness with certain activities. Ear pressure isn't going away. Frontal sinus HA has not resolved. She is taking magnesium to help with muscle tightness, no improvement, she has had it for a couple weeks. She is trying to do self massage but that also hasn't worked. Muscle relaxers have been helping a lot and appliance has been helping.      Her mom has ovarian cancer, she just had 2nd surgery for reoccurence and is getting scans tomorrow. She has a 16mo old named Leslie. Her partner (last name Chalo) is did get deployed to [facility] in June.       Objective   /72 (BP Location: Left arm, Patient Position: Sitting, BP Cuff Size: Adult)   Pulse 84   Ht 1.6 m (5' 3\")   Wt 78 kg (172 lb)   BMI 30.47 kg/m²     Gen: No acute distress, alert and oriented x3, pleasant   HEENT: moist mucous membranes, b/l external auditory canals are clear of debris, TMs within normal limits, no oropharyngeal lesions, eomi, perrla   Neck: thyroid within normal limits, no lymphadenopathy   CV: RRR, normal S1/S2, no murmur   Resp: Clear to auscultation bilaterally, no wheezes or rhonchi appreciated  Abd: soft, nontender, non-distended, no guarding/rigidity, bowel sounds present  Extr: no edema, no calf tenderness  Derm: Skin is warm and dry, no rashes appreciated  Psych: mood is good, affect is congruent, good hygiene, normal speech and eye contact  Neuro: cranial nerves grossly intact, normal " gait    Assessment/Plan   #TMJ  Getting HA, eustachian tube dysfunction, and vertigo  On prednisone currently  Multiple rounds of antibiotics  Doing really well with dental appliance  Muscle relaxer prn has been helpful     #Anxiety  On lexapro  Feels it is helping   Hold at current dose    #Scalp psoriasis  Rx sent ketoconazole shampoo  Call back in 1 mo if no improvement

## 2024-08-08 ENCOUNTER — APPOINTMENT (OUTPATIENT)
Dept: PRIMARY CARE | Facility: CLINIC | Age: 24
End: 2024-08-08
Payer: MEDICAID

## 2024-08-08 VITALS
BODY MASS INDEX: 30.48 KG/M2 | WEIGHT: 172 LBS | HEIGHT: 63 IN | SYSTOLIC BLOOD PRESSURE: 113 MMHG | HEART RATE: 84 BPM | DIASTOLIC BLOOD PRESSURE: 72 MMHG

## 2024-08-08 DIAGNOSIS — L40.8: Primary | ICD-10-CM

## 2024-08-08 DIAGNOSIS — F41.9 ANXIETY: ICD-10-CM

## 2024-08-08 PROCEDURE — 99214 OFFICE O/P EST MOD 30 MIN: CPT | Performed by: FAMILY MEDICINE

## 2024-08-08 PROCEDURE — 3008F BODY MASS INDEX DOCD: CPT | Performed by: FAMILY MEDICINE

## 2024-08-08 RX ORDER — KETOCONAZOLE 20 MG/ML
SHAMPOO, SUSPENSION TOPICAL 2 TIMES WEEKLY
Qty: 120 ML | Refills: 0 | Status: SHIPPED | OUTPATIENT
Start: 2024-08-08

## 2024-08-08 RX ORDER — ESCITALOPRAM OXALATE 10 MG/1
10 TABLET ORAL DAILY
Qty: 90 TABLET | Refills: 3 | Status: SHIPPED | OUTPATIENT
Start: 2024-08-08 | End: 2025-08-08

## 2024-09-09 DIAGNOSIS — L40.8: ICD-10-CM

## 2024-09-09 RX ORDER — KETOCONAZOLE 20 MG/ML
SHAMPOO, SUSPENSION TOPICAL
Qty: 120 ML | Refills: 0 | Status: SHIPPED | OUTPATIENT
Start: 2024-09-09 | End: 2024-09-11

## 2024-09-11 DIAGNOSIS — L40.8: ICD-10-CM

## 2024-09-11 RX ORDER — KETOCONAZOLE 20 MG/ML
SHAMPOO, SUSPENSION TOPICAL
Qty: 120 ML | Refills: 0 | Status: SHIPPED | OUTPATIENT
Start: 2024-09-11

## 2025-02-10 ENCOUNTER — APPOINTMENT (OUTPATIENT)
Dept: PRIMARY CARE | Facility: CLINIC | Age: 25
End: 2025-02-10
Payer: MEDICAID

## 2025-02-10 DIAGNOSIS — R21 RASH: Primary | ICD-10-CM

## 2025-02-25 DIAGNOSIS — E55.9 VITAMIN D DEFICIENCY: Primary | ICD-10-CM

## 2025-02-25 LAB
25(OH)D3+25(OH)D2 SERPL-MCNC: 23 NG/ML (ref 30–100)
ALBUMIN SERPL-MCNC: 4.4 G/DL (ref 3.6–5.1)
ALP SERPL-CCNC: 84 U/L (ref 31–125)
ALT SERPL-CCNC: 12 U/L (ref 6–29)
ANION GAP SERPL CALCULATED.4IONS-SCNC: 8 MMOL/L (CALC) (ref 7–17)
AST SERPL-CCNC: 14 U/L (ref 10–30)
BASOPHILS # BLD AUTO: 21 CELLS/UL (ref 0–200)
BASOPHILS NFR BLD AUTO: 0.5 %
BILIRUB SERPL-MCNC: 0.5 MG/DL (ref 0.2–1.2)
BUN SERPL-MCNC: 10 MG/DL (ref 7–25)
CALCIUM SERPL-MCNC: 8.8 MG/DL (ref 8.6–10.2)
CHLORIDE SERPL-SCNC: 103 MMOL/L (ref 98–110)
CO2 SERPL-SCNC: 25 MMOL/L (ref 20–32)
CREAT SERPL-MCNC: 0.69 MG/DL (ref 0.5–0.96)
EGFRCR SERPLBLD CKD-EPI 2021: 123 ML/MIN/1.73M2
EOSINOPHIL # BLD AUTO: 109 CELLS/UL (ref 15–500)
EOSINOPHIL NFR BLD AUTO: 2.6 %
ERYTHROCYTE [DISTWIDTH] IN BLOOD BY AUTOMATED COUNT: 11.8 % (ref 11–15)
GLUCOSE SERPL-MCNC: 95 MG/DL (ref 65–139)
HCT VFR BLD AUTO: 39.3 % (ref 35–45)
HGB BLD-MCNC: 12.7 G/DL (ref 11.7–15.5)
IRON SERPL-MCNC: 73 MCG/DL (ref 40–190)
LYMPHOCYTES # BLD AUTO: 1705 CELLS/UL (ref 850–3900)
LYMPHOCYTES NFR BLD AUTO: 40.6 %
MCH RBC QN AUTO: 29.4 PG (ref 27–33)
MCHC RBC AUTO-ENTMCNC: 32.3 G/DL (ref 32–36)
MCV RBC AUTO: 91 FL (ref 80–100)
MONOCYTES # BLD AUTO: 298 CELLS/UL (ref 200–950)
MONOCYTES NFR BLD AUTO: 7.1 %
NEUTROPHILS # BLD AUTO: 2066 CELLS/UL (ref 1500–7800)
NEUTROPHILS NFR BLD AUTO: 49.2 %
PLATELET # BLD AUTO: 238 THOUSAND/UL (ref 140–400)
PMV BLD REES-ECKER: 10.3 FL (ref 7.5–12.5)
POTASSIUM SERPL-SCNC: 4 MMOL/L (ref 3.5–5.3)
PROT SERPL-MCNC: 7 G/DL (ref 6.1–8.1)
RBC # BLD AUTO: 4.32 MILLION/UL (ref 3.8–5.1)
SODIUM SERPL-SCNC: 136 MMOL/L (ref 135–146)
TSH SERPL-ACNC: 1.59 MIU/L
VIT B12 SERPL-MCNC: 875 PG/ML (ref 200–1100)
WBC # BLD AUTO: 4.2 THOUSAND/UL (ref 3.8–10.8)

## 2025-02-25 RX ORDER — ERGOCALCIFEROL 1.25 MG/1
50000 CAPSULE ORAL WEEKLY
Qty: 12 CAPSULE | Refills: 2 | Status: SHIPPED | OUTPATIENT
Start: 2025-02-25 | End: 2025-11-04